# Patient Record
Sex: MALE | Race: WHITE | NOT HISPANIC OR LATINO
[De-identification: names, ages, dates, MRNs, and addresses within clinical notes are randomized per-mention and may not be internally consistent; named-entity substitution may affect disease eponyms.]

---

## 2023-07-27 PROBLEM — Z00.00 ENCOUNTER FOR PREVENTIVE HEALTH EXAMINATION: Status: ACTIVE | Noted: 2023-07-27

## 2023-08-03 ENCOUNTER — APPOINTMENT (OUTPATIENT)
Dept: CARDIOLOGY | Facility: CLINIC | Age: 58
End: 2023-08-03
Payer: COMMERCIAL

## 2023-08-03 VITALS — DIASTOLIC BLOOD PRESSURE: 90 MMHG | SYSTOLIC BLOOD PRESSURE: 124 MMHG

## 2023-08-03 VITALS
DIASTOLIC BLOOD PRESSURE: 96 MMHG | WEIGHT: 274 LBS | HEART RATE: 88 BPM | HEIGHT: 72 IN | OXYGEN SATURATION: 94 % | SYSTOLIC BLOOD PRESSURE: 160 MMHG | BODY MASS INDEX: 37.11 KG/M2

## 2023-08-03 DIAGNOSIS — Z99.89 DEPENDENCE ON OTHER ENABLING MACHINES AND DEVICES: ICD-10-CM

## 2023-08-03 DIAGNOSIS — Z78.9 OTHER SPECIFIED HEALTH STATUS: ICD-10-CM

## 2023-08-03 DIAGNOSIS — Z87.891 PERSONAL HISTORY OF NICOTINE DEPENDENCE: ICD-10-CM

## 2023-08-03 PROCEDURE — 99203 OFFICE O/P NEW LOW 30 MIN: CPT | Mod: 25

## 2023-08-03 PROCEDURE — 93000 ELECTROCARDIOGRAM COMPLETE: CPT

## 2023-08-03 RX ORDER — FENOFIBRATE 160 MG/1
160 TABLET ORAL DAILY
Refills: 0 | Status: ACTIVE | COMMUNITY
Start: 2023-08-03

## 2023-08-03 NOTE — REVIEW OF SYSTEMS
[Negative] : Heme/Lymph [Weight Gain (___ Lbs)] : [unfilled] ~Ulb weight gain [Lower Ext Edema] : lower extremity edema [Snoring] : snoring [Heartburn] : heartburn [Weight Loss (___ Lbs)] : no recent weight loss [FreeTextEntry5] : LE edema related to excessive salt intake [FreeTextEntry7] : GERD

## 2023-08-03 NOTE — HISTORY OF PRESENT ILLNESS
[FreeTextEntry1] : AC TEJEDA is a 57 year yo man with hypertriglyceridemia, hypertension, obesity, prediabetes and obstructive sleep apnea referred for cardiology care.  He he quit smoking in January and gained almost 50 pounds.  He has been eating poorly and has noticed with salty foods lower bilateral lower extremity swelling.  Several weeks ago he started using a CPAP machine.  Recent laboratory was notable for triglycerides of 465 MGs/DL, HDL of 30 mg/DL and a hemoglobin A1c of 5.8.  The LDL could not be calculated.  He did not recall if this was a fasting blood sample.  Several weeks ago he drastically changed his diet reducing salt intake, meats, saturated fats, butter, caffeine and also started exercising on a regular basis.  He is now following a Mediterranean type diet.  He is walking 1 mile, biking and he does other exercise as daily push-ups.  He has no chest discomfort or shortness of breath with these exercises and with his activities as climbing several flights of stairs.  There is no prior history of a clinical myocardial infarction, coronary revascularization, symptomatic congestive heart failure, rheumatic heart disease, valvular disease, symptomatic arrhythmias including atrial fibrillation.  There is no history of cerebrovascular events. There is no history of hypertension, diabetes, or renal insufficiency.  BP PE at home has not been consistently elevated with a recent of 109/71 mmHg.

## 2023-08-03 NOTE — DISCUSSION/SUMMARY
[EKG obtained to assist in diagnosis and management of assessed problem(s)] : EKG obtained to assist in diagnosis and management of assessed problem(s) [FreeTextEntry1] : 1. We reviewed the importance of a healthy lifestyle including: weight loss, maintenance of normal body weight, increased physical activity with 30 to 45 minutes of exercise most days of the week, Mediterranean style diet with fresh fruits, vegetables, nuts, beans, seeds, legumes, whole grain products, lean proteins from fish and other seafood, olive oil as a primary fat source, some low-fat dairy products, some poultry and limiting consumption of sweets, highly processed foods, meats and saturated fats.  We also discussed restriction of salt and CHO intake. 2. Continue gemfibrozil 3.  We reviewed nonpharmacological methods for BP control including salt restriction, weight loss, regular exercise and treatment of obstructive sleep apnea.  He is just started doing all these and his preference is to see if this helps to control his blood pressure before starting pharmacological therapy.  I asked him to monitor his blood pressure at home. 4.  TTE to evaluate cardiac structure and function in setting of hypertension and MACHO. 5.  He has continued follow-up with his PCP.  Cardiology follow-up with a fasting lipid profile will be in 3 months or sooner as needed.

## 2023-08-03 NOTE — CARDIOLOGY SUMMARY
[de-identified] : 7/25/2023 normal tracing rate 80 bpm [de-identified] : Laboratory 7/10/2023 total cholesterol 192, HDL 30, triglycerides 465, LDL cannot be calculated, K4.3, creatinine 0.9, normal LFTs, normal TFTs, hemoglobin 15.4, hematocrit 45.5%, hemoglobin A1c 5.8

## 2023-09-07 ENCOUNTER — APPOINTMENT (OUTPATIENT)
Dept: CARDIOLOGY | Facility: CLINIC | Age: 58
End: 2023-09-07
Payer: COMMERCIAL

## 2023-09-07 PROCEDURE — 93306 TTE W/DOPPLER COMPLETE: CPT

## 2023-11-16 ENCOUNTER — APPOINTMENT (OUTPATIENT)
Dept: CARDIOLOGY | Facility: CLINIC | Age: 58
End: 2023-11-16
Payer: COMMERCIAL

## 2023-11-16 VITALS
BODY MASS INDEX: 35.67 KG/M2 | SYSTOLIC BLOOD PRESSURE: 138 MMHG | HEART RATE: 61 BPM | DIASTOLIC BLOOD PRESSURE: 80 MMHG | WEIGHT: 263 LBS | OXYGEN SATURATION: 95 %

## 2023-11-16 VITALS — SYSTOLIC BLOOD PRESSURE: 146 MMHG | DIASTOLIC BLOOD PRESSURE: 90 MMHG

## 2023-11-16 PROCEDURE — 99213 OFFICE O/P EST LOW 20 MIN: CPT

## 2024-02-20 ENCOUNTER — APPOINTMENT (OUTPATIENT)
Dept: CARDIOLOGY | Facility: CLINIC | Age: 59
End: 2024-02-20

## 2024-02-20 DIAGNOSIS — G47.33 OBSTRUCTIVE SLEEP APNEA (ADULT) (PEDIATRIC): ICD-10-CM

## 2024-02-20 DIAGNOSIS — E78.5 HYPERLIPIDEMIA, UNSPECIFIED: ICD-10-CM

## 2024-02-20 DIAGNOSIS — R73.03 PREDIABETES.: ICD-10-CM

## 2024-02-26 NOTE — HISTORY OF PRESENT ILLNESS
[FreeTextEntry1] : AC TEJEDA is a 58-year-old man with hypertriglyceridemia, hypertension, obesity, prediabetes and obstructive sleep apnea .  After he quit smoking last year he gained almost 50 pounds.  He has been eating poorly and has noticed with salty foods lower bilateral lower extremity swelling.  Several weeks ago he started using a CPAP machine.  Laboratory was notable for triglycerides of 465 MGs/DL, HDL of 30 mg/DL and a hemoglobin A1c of 5.8.  The LDL could not be calculated.  He did not recall if this was a fasting blood sample.  Subsequently he changed his diet reducing salt intake, meats, saturated fats, butter, caffeine and also started exercising on a regular basis.  He is now following a Mediterranean type diet.  He is walking 1 mile, biking and he does other exercise as daily push-ups.  He has no chest discomfort or shortness of breath with these exercises and with his activities as climbing several flights of stairs.  There is no prior history of a clinical myocardial infarction, coronary revascularization, symptomatic congestive heart failure, rheumatic heart disease, valvular disease, symptomatic arrhythmias including atrial fibrillation.  There is no history of cerebrovascular events. There is no history of hypertension, diabetes, or renal insufficiency.  BP at home has been in good range mmHg.

## 2024-02-26 NOTE — DISCUSSION/SUMMARY
[FreeTextEntry1] : 1. We again reviewed the importance of a healthy lifestyle including: weight loss, maintenance of normal body weight, increased physical activity with 30 to 45 minutes of exercise most days of the week, Mediterranean style diet with fresh fruits, vegetables, nuts, beans, seeds, legumes, whole grain products, lean proteins from fish and other seafood, olive oil as a primary fat source, some low-fat dairy products, some poultry and limiting consumption of sweets, highly processed foods, meats and saturated fats.  We stressed reduction of red meat and saturated fat intake. 2. Continue gemfibrozil.  Discussed addition of a statin considering his syndrome.  His preference is for continued diet modification. 3.  We reviewed nonpharmacological methods for BP control including salt restriction, weight loss, regular exercise and treatment of obstructive sleep apnea.  Considering his blood pressure today and the mildly dilated my aorta was recommended that we initiate antihypertensive therapy.  His preference is to avoid pharmacological therapy and wishes to try nonpharmacological means for several months.  I asked him to monitor his blood pressure at home. 4.  We will plan to repeat TTE in 1 year. 5.  He has continued follow-up with his PCP.  Cardiology follow-up with a fasting lipid profile will be in 3 months or sooner as needed.

## 2024-02-26 NOTE — CARDIOLOGY SUMMARY
[de-identified] : 7/25/2023 normal tracing rate 80 bpm [de-identified] : 9/12/2023 1. Technically difficult image quality. 2. Left ventricular systolic function is normal with an ejection fraction visually estimated at 60 to 65 %. 3. There is normal left ventricular diastolic function. 4. The aortic annulus is mildly dilated, measuring 4.3 cm. The ascending aorta diameter is dilated 3.7 cm.. The aortic arch diameter is upper limits of normal, measuring 3.6 cm (indexed 1.48 cm/m). 5. The right atrium is dilated in size. 6. Trace tricuspid regurgitation. 7. Estimated pulmonary artery systolic pressure is 17 mmHg. 8. Trace mitral regurgitation. 9. No pericardial effusion seen. 10. No prior echocardiogram is available for comparison. [de-identified] : Laboratory 7/10/2023 total cholesterol 192, HDL 30, triglycerides 465, LDL cannot be calculated, K4.3, creatinine 0.9, normal LFTs, normal TFTs, hemoglobin 15.4, hematocrit 45.5%, hemoglobin A1c 5.8

## 2024-02-26 NOTE — ASSESSMENT
[FreeTextEntry1] :  57 year yo man with hypertriglyceridemia, hypertension, obesity, prediabetes and obstructive sleep apnea. Diet has

## 2024-02-26 NOTE — REASON FOR VISIT
[FreeTextEntry1] : Mr. Gonzáles returns for follow-up of blood pressure and lipids with non-pharmacologic therapy.  He has continued to lose weight.  He has not been checking his blood pressure.  He has not been careful with his diet.  Last night he had meatballs and sausage.  He continues to be active walking properties and walking stairs.  These activities are well-tolerated without chest discomfort or shortness of breath.  His echocardiogram in September showed normal LV systolic and diastolic function.  The estimated pulmonary systolic pressure was 17 mmHg.  There was mild right atrial enlargement.  The proximal aorta and the arch and ascending aorta were mildly dilated. Laboratory from 10/17/2023 showed total cholesterol 191, HDL 33, triglycerides 242, , creatinine 1.08, EGFR 81, potassium 4.6, normal LFTs. ? f/u lipids with PCP

## 2024-02-27 ENCOUNTER — APPOINTMENT (OUTPATIENT)
Dept: CARDIOLOGY | Facility: CLINIC | Age: 59
End: 2024-02-27

## 2024-02-28 NOTE — CARDIOLOGY SUMMARY
[de-identified] : 7/25/2023 normal tracing rate 80 bpm [de-identified] : 9/12/2023 1. Technically difficult image quality. 2. Left ventricular systolic function is normal with an ejection fraction visually estimated at 60 to 65 %. 3. There is normal left ventricular diastolic function. 4. The aortic annulus is mildly dilated, measuring 4.3 cm. The ascending aorta diameter is dilated 3.7 cm.. The aortic arch diameter is upper limits of normal, measuring 3.6 cm (indexed 1.48 cm/m). 5. The right atrium is dilated in size. 6. Trace tricuspid regurgitation. 7. Estimated pulmonary artery systolic pressure is 17 mmHg. 8. Trace mitral regurgitation. 9. No pericardial effusion seen. 10. No prior echocardiogram is available for comparison. [de-identified] : Laboratory 7/10/2023 total cholesterol 192, HDL 30, triglycerides 465, LDL cannot be calculated, K4.3, creatinine 0.9, normal LFTs, normal TFTs, hemoglobin 15.4, hematocrit 45.5%, hemoglobin A1c 5.8

## 2024-06-24 PROBLEM — E66.9 OBESITY (BMI 30-39.9): Status: ACTIVE | Noted: 2024-02-20

## 2024-06-24 PROBLEM — E88.810 METABOLIC SYNDROME: Status: ACTIVE | Noted: 2023-08-03

## 2024-06-24 PROBLEM — I10 HYPERTENSION: Status: ACTIVE | Noted: 2023-08-03

## 2024-06-24 PROBLEM — I77.810 MILD ASCENDING AORTA DILATATION: Status: ACTIVE | Noted: 2023-11-16

## 2024-06-25 ENCOUNTER — APPOINTMENT (OUTPATIENT)
Dept: CARDIOLOGY | Facility: CLINIC | Age: 59
End: 2024-06-25

## 2024-06-25 DIAGNOSIS — E66.9 OBESITY, UNSPECIFIED: ICD-10-CM

## 2024-06-25 DIAGNOSIS — I10 ESSENTIAL (PRIMARY) HYPERTENSION: ICD-10-CM

## 2024-06-25 DIAGNOSIS — E88.810 METABOLIC SYNDROME: ICD-10-CM

## 2024-06-25 DIAGNOSIS — I77.810 THORACIC AORTIC ECTASIA: ICD-10-CM

## 2024-08-08 ENCOUNTER — NON-APPOINTMENT (OUTPATIENT)
Age: 59
End: 2024-08-08

## 2024-08-08 ENCOUNTER — APPOINTMENT (OUTPATIENT)
Dept: CARDIOLOGY | Facility: CLINIC | Age: 59
End: 2024-08-08

## 2024-08-08 PROCEDURE — 93306 TTE W/DOPPLER COMPLETE: CPT

## 2024-08-08 PROCEDURE — 93000 ELECTROCARDIOGRAM COMPLETE: CPT

## 2024-08-08 PROCEDURE — 99213 OFFICE O/P EST LOW 20 MIN: CPT

## 2024-08-08 NOTE — REASON FOR VISIT
[FreeTextEntry1] : Mr. Gonzáles returns for follow-up of blood pressure and lipids with non-pharmacologic therapy.  He has continued to lose weight.    Continue gemfibrozil.  Discussed addition of a statin his preference is for continued diet modification. repeat TTE  6/25/2024 fasting lipid profile   His preference is to avoid pharmacological therapy and wishes to try nonpharmacological means for several months.  I asked him to monitor his blood pressure at home. 4.  We will plan to repeat TTE in 1 year. 5.  He has continued follow-up with his PCP.  Cardiology follow-up with a fasting lipid profile will be in 3 months or sooner as needed. [Hyperlipidemia] : hyperlipidemia [Hypertension] : hypertension

## 2024-08-08 NOTE — ASSESSMENT
[FreeTextEntry1] :  56 yo man with hypertriglyceridemia, hypertension, mildly dilated proximal aorta, obesity and obstructive sleep apnea.  He has not been as strict with his diet, salt intake, Rx of MACHO and other lifestyle interventions recently.  His blood pressure today is mildly elevated.  He has not had recent repeat lipids.

## 2024-08-08 NOTE — CARDIOLOGY SUMMARY
[de-identified] : 7/25/2023 normal tracing rate 80 bpm 8/8/2024 Sinus Rhythm, Nonspecific QRS widening. [de-identified] : 9/12/2023 1. Technically difficult image quality. 2. Left ventricular systolic function is normal with an ejection fraction visually estimated at 60 to 65 %. 3. There is normal left ventricular diastolic function. 4. The aortic annulus is mildly dilated, measuring 4.3 cm. The ascending aorta diameter is dilated 3.7 cm.. The aortic arch diameter is upper limits of normal, measuring 3.6 cm (indexed 1.48 cm/m). 5. The right atrium is dilated in size. 6. Trace tricuspid regurgitation. 7. Estimated pulmonary artery systolic pressure is 17 mmHg. 8. Trace mitral regurgitation. 9. No pericardial effusion seen. 10. No prior echocardiogram is available for comparison. [de-identified] : Laboratory 7/10/2023 total cholesterol 192, HDL 30, triglycerides 465, LDL cannot be calculated, K4.3, creatinine 0.9, normal LFTs, normal TFTs, hemoglobin 15.4, hematocrit 45.5%, hemoglobin A1c 5.8

## 2024-08-08 NOTE — PHYSICAL EXAM
[Well Developed] : well developed [Well Nourished] : well nourished [No Acute Distress] : no acute distress [Obese] : obese [Normal Conjunctiva] : normal conjunctiva [Normal Venous Pressure] : normal venous pressure [No Carotid Bruit] : no carotid bruit [Normal S1, S2] : normal S1, S2 [No Murmur] : no murmur [No Rub] : no rub [No Gallop] : no gallop [Clear Lung Fields] : clear lung fields [Good Air Entry] : good air entry [No Respiratory Distress] : no respiratory distress  [Soft] : abdomen soft [Non Tender] : non-tender [Normal Bowel Sounds] : normal bowel sounds [Normal Gait] : normal gait [No Edema] : no edema [No Cyanosis] : no cyanosis [No Rash] : no rash [No Skin Lesions] : no skin lesions [Moves all extremities] : moves all extremities [No Focal Deficits] : no focal deficits [Normal Speech] : normal speech [Alert and Oriented] : alert and oriented [Normal memory] : normal memory

## 2024-08-08 NOTE — DISCUSSION/SUMMARY
[FreeTextEntry1] : 1. We again reviewed the importance of a healthy lifestyle including weight loss, maintenance of normal body weight, regular physical activity with 30 to 45 minutes of exercise most days of the week, Mediterranean style diet with fresh fruits, vegetables, nuts, beans, seeds, legumes, whole grain products, lean proteins from fish and other seafood, olive oil as a primary fat source, some low-fat dairy products, some poultry and limiting consumption of salt, sweets, highly processed foods, meats and saturated fats.   2.  We reviewed nonpharmacological methods for BP control including salt restriction, weight loss, regular exercise and treatment of obstructive sleep apnea.  Considering his blood pressure today and the mildly dilated my aorta was recommended that we initiate antihypertensive therapy.  His preference is to avoid pharmacological therapy and wishes to try nonpharmacological means for several months.  I asked him to monitor his blood pressure at home. 4.  Repeat lipids 5.  Cardiology follow-up to review progress will be in 2 months or sooner prn. [EKG obtained to assist in diagnosis and management of assessed problem(s)] : EKG obtained to assist in diagnosis and management of assessed problem(s)

## 2024-08-08 NOTE — CARDIOLOGY SUMMARY
[de-identified] : 7/25/2023 normal tracing rate 80 bpm 8/8/2024 Sinus Rhythm, Nonspecific QRS widening. [de-identified] : 9/12/2023 1. Technically difficult image quality. 2. Left ventricular systolic function is normal with an ejection fraction visually estimated at 60 to 65 %. 3. There is normal left ventricular diastolic function. 4. The aortic annulus is mildly dilated, measuring 4.3 cm. The ascending aorta diameter is dilated 3.7 cm.. The aortic arch diameter is upper limits of normal, measuring 3.6 cm (indexed 1.48 cm/m). 5. The right atrium is dilated in size. 6. Trace tricuspid regurgitation. 7. Estimated pulmonary artery systolic pressure is 17 mmHg. 8. Trace mitral regurgitation. 9. No pericardial effusion seen. 10. No prior echocardiogram is available for comparison. [de-identified] : Laboratory 7/10/2023 total cholesterol 192, HDL 30, triglycerides 465, LDL cannot be calculated, K4.3, creatinine 0.9, normal LFTs, normal TFTs, hemoglobin 15.4, hematocrit 45.5%, hemoglobin A1c 5.8

## 2024-08-08 NOTE — HISTORY OF PRESENT ILLNESS
[FreeTextEntry1] : AC TEJEDA is a 58-year-old man with hypertriglyceridemia, hypertension, obesity, prediabetes and obstructive sleep apnea .  After he quit smoking last year he gained almost 50 pounds.  He has been eating poorly and has noticed with salty foods lower bilateral lower extremity swelling. He started using a CPAP machine but more recently has not been using it on a regular basis.  Laboratory was notable for triglycerides of 465 MGs/DL, HDL of 30 mg/DL and a hemoglobin A1c of 5.8.  The LDL could not be calculated. He did not recall if this was a fasting blood sample.  He had been on fenofibrate but stopped taking it about a month ago.  Subsequently he changed his diet reducing salt intake, meats, saturated fats, butter, caffeine and also started exercising on a regular basis.  He wasw following a Mediterranean type diet.  He is a  and walks 28,000 steps as well as other exercise as push-ups.  He does not have chest discomfort or shortness of breath.  He denies orthopnea, PND, palpitations, lightheadedness and peripheral edema.  There is no prior history of a clinical myocardial infarction, coronary revascularization, symptomatic congestive heart failure, rheumatic heart disease, valvular disease, symptomatic arrhythmias including atrial fibrillation.  There is no history of cerebrovascular events. There is no history of hypertension, diabetes, or renal insufficiency.  He has not been checking his blood pressure at home.  He did not have fasting lipid profile as planned.  He has a rare alcoholic drink.  He drinks several cups of coffee in the morning.  Laboratory 2/20/2024 total cholesterol 203, HDL 41, triglycerides 287, hemoglobin A1c 5.4, , LFTs normal, sodium 140, potassium 4.3 EGFR 80.

## 2024-08-08 NOTE — CARDIOLOGY SUMMARY
[de-identified] : 7/25/2023 normal tracing rate 80 bpm 8/8/2024 Sinus Rhythm, Nonspecific QRS widening. [de-identified] : 9/12/2023 1. Technically difficult image quality. 2. Left ventricular systolic function is normal with an ejection fraction visually estimated at 60 to 65 %. 3. There is normal left ventricular diastolic function. 4. The aortic annulus is mildly dilated, measuring 4.3 cm. The ascending aorta diameter is dilated 3.7 cm.. The aortic arch diameter is upper limits of normal, measuring 3.6 cm (indexed 1.48 cm/m). 5. The right atrium is dilated in size. 6. Trace tricuspid regurgitation. 7. Estimated pulmonary artery systolic pressure is 17 mmHg. 8. Trace mitral regurgitation. 9. No pericardial effusion seen. 10. No prior echocardiogram is available for comparison. [de-identified] : Laboratory 7/10/2023 total cholesterol 192, HDL 30, triglycerides 465, LDL cannot be calculated, K4.3, creatinine 0.9, normal LFTs, normal TFTs, hemoglobin 15.4, hematocrit 45.5%, hemoglobin A1c 5.8

## 2024-08-24 ENCOUNTER — NON-APPOINTMENT (OUTPATIENT)
Age: 59
End: 2024-08-24

## 2024-09-09 NOTE — PHYSICAL EXAM
Withheld/Decline to Answer [Well Developed] : well developed [Well Nourished] : well nourished [No Acute Distress] : no acute distress [Obese] : obese [Normal Conjunctiva] : normal conjunctiva [Normal Venous Pressure] : normal venous pressure [No Carotid Bruit] : no carotid bruit [Normal S1, S2] : normal S1, S2 [No Murmur] : no murmur [No Rub] : no rub [No Gallop] : no gallop [Clear Lung Fields] : clear lung fields [Good Air Entry] : good air entry [No Respiratory Distress] : no respiratory distress  [Soft] : abdomen soft [Non Tender] : non-tender [Normal Bowel Sounds] : normal bowel sounds [Normal Gait] : normal gait [No Edema] : no edema [No Cyanosis] : no cyanosis [No Rash] : no rash [No Skin Lesions] : no skin lesions [Moves all extremities] : moves all extremities [No Focal Deficits] : no focal deficits [Normal Speech] : normal speech [Alert and Oriented] : alert and oriented [Normal memory] : normal memory

## 2024-10-08 ENCOUNTER — APPOINTMENT (OUTPATIENT)
Dept: CARDIOLOGY | Facility: CLINIC | Age: 59
End: 2024-10-08

## 2024-10-08 DIAGNOSIS — G47.33 OBSTRUCTIVE SLEEP APNEA (ADULT) (PEDIATRIC): ICD-10-CM

## 2024-10-08 DIAGNOSIS — I10 ESSENTIAL (PRIMARY) HYPERTENSION: ICD-10-CM

## 2024-10-08 DIAGNOSIS — R73.03 PREDIABETES.: ICD-10-CM

## 2024-10-08 DIAGNOSIS — E78.5 HYPERLIPIDEMIA, UNSPECIFIED: ICD-10-CM

## 2024-10-08 DIAGNOSIS — E88.810 METABOLIC SYNDROME: ICD-10-CM

## 2024-10-08 DIAGNOSIS — E66.9 OBESITY, UNSPECIFIED: ICD-10-CM

## 2025-02-20 ENCOUNTER — NON-APPOINTMENT (OUTPATIENT)
Age: 60
End: 2025-02-20

## 2025-02-20 ENCOUNTER — APPOINTMENT (OUTPATIENT)
Dept: CARDIOLOGY | Facility: CLINIC | Age: 60
End: 2025-02-20
Payer: COMMERCIAL

## 2025-02-20 VITALS
SYSTOLIC BLOOD PRESSURE: 134 MMHG | BODY MASS INDEX: 36.35 KG/M2 | HEART RATE: 74 BPM | WEIGHT: 268 LBS | OXYGEN SATURATION: 95 % | DIASTOLIC BLOOD PRESSURE: 80 MMHG

## 2025-02-20 VITALS — DIASTOLIC BLOOD PRESSURE: 96 MMHG | SYSTOLIC BLOOD PRESSURE: 138 MMHG

## 2025-02-20 DIAGNOSIS — I10 ESSENTIAL (PRIMARY) HYPERTENSION: ICD-10-CM

## 2025-02-20 DIAGNOSIS — G47.33 OBSTRUCTIVE SLEEP APNEA (ADULT) (PEDIATRIC): ICD-10-CM

## 2025-02-20 DIAGNOSIS — E66.9 OBESITY, UNSPECIFIED: ICD-10-CM

## 2025-02-20 DIAGNOSIS — I77.810 THORACIC AORTIC ECTASIA: ICD-10-CM

## 2025-02-20 DIAGNOSIS — E88.810 METABOLIC SYNDROME: ICD-10-CM

## 2025-02-20 PROCEDURE — 93000 ELECTROCARDIOGRAM COMPLETE: CPT

## 2025-02-20 PROCEDURE — 99214 OFFICE O/P EST MOD 30 MIN: CPT

## 2025-02-20 RX ORDER — FENOFIBRATE 150 MG/1
CAPSULE ORAL
Refills: 0 | Status: ACTIVE | COMMUNITY

## 2025-02-20 RX ORDER — LISINOPRIL 5 MG/1
5 TABLET ORAL DAILY
Qty: 90 | Refills: 3 | Status: ACTIVE | COMMUNITY

## 2025-02-20 RX ORDER — TIRZEPATIDE 5 MG/.5ML
INJECTION, SOLUTION SUBCUTANEOUS
Refills: 0 | Status: ACTIVE | COMMUNITY

## 2025-03-06 ENCOUNTER — APPOINTMENT (OUTPATIENT)
Dept: CARDIOLOGY | Facility: CLINIC | Age: 60
End: 2025-03-06

## 2025-03-10 ENCOUNTER — APPOINTMENT (OUTPATIENT)
Dept: CARDIOLOGY | Facility: CLINIC | Age: 60
End: 2025-03-10
Payer: COMMERCIAL

## 2025-03-10 PROCEDURE — 93306 TTE W/DOPPLER COMPLETE: CPT

## 2025-03-25 ENCOUNTER — APPOINTMENT (OUTPATIENT)
Dept: CARDIOLOGY | Facility: CLINIC | Age: 60
End: 2025-03-25

## 2025-03-26 PROBLEM — R06.02 SHORTNESS OF BREATH ON EXERTION: Status: ACTIVE | Noted: 2025-03-26

## 2025-05-14 ENCOUNTER — NON-APPOINTMENT (OUTPATIENT)
Age: 60
End: 2025-05-14

## 2025-05-14 ENCOUNTER — APPOINTMENT (OUTPATIENT)
Dept: CARDIOLOGY | Facility: CLINIC | Age: 60
End: 2025-05-14
Payer: COMMERCIAL

## 2025-05-14 PROCEDURE — 93015 CV STRESS TEST SUPVJ I&R: CPT

## 2025-05-20 ENCOUNTER — APPOINTMENT (OUTPATIENT)
Dept: CARDIOLOGY | Facility: CLINIC | Age: 60
End: 2025-05-20
Payer: COMMERCIAL

## 2025-05-20 VITALS
SYSTOLIC BLOOD PRESSURE: 128 MMHG | HEIGHT: 72 IN | WEIGHT: 234 LBS | HEART RATE: 77 BPM | OXYGEN SATURATION: 96 % | DIASTOLIC BLOOD PRESSURE: 80 MMHG | BODY MASS INDEX: 31.69 KG/M2

## 2025-05-20 DIAGNOSIS — I77.810 THORACIC AORTIC ECTASIA: ICD-10-CM

## 2025-05-20 DIAGNOSIS — E78.5 HYPERLIPIDEMIA, UNSPECIFIED: ICD-10-CM

## 2025-05-20 DIAGNOSIS — R06.02 SHORTNESS OF BREATH: ICD-10-CM

## 2025-05-20 DIAGNOSIS — G47.33 OBSTRUCTIVE SLEEP APNEA (ADULT) (PEDIATRIC): ICD-10-CM

## 2025-05-20 DIAGNOSIS — I10 ESSENTIAL (PRIMARY) HYPERTENSION: ICD-10-CM

## 2025-05-20 DIAGNOSIS — E66.9 OBESITY, UNSPECIFIED: ICD-10-CM

## 2025-05-20 PROCEDURE — 99213 OFFICE O/P EST LOW 20 MIN: CPT
